# Patient Record
Sex: MALE | URBAN - METROPOLITAN AREA
[De-identification: names, ages, dates, MRNs, and addresses within clinical notes are randomized per-mention and may not be internally consistent; named-entity substitution may affect disease eponyms.]

---

## 2025-02-08 ENCOUNTER — PHARMACY VISIT (OUTPATIENT)
Dept: PHARMACY | Facility: CLINIC | Age: 70
End: 2025-02-08
Payer: COMMERCIAL

## 2025-02-08 ENCOUNTER — OFFICE VISIT (OUTPATIENT)
Dept: URGENT CARE | Age: 70
End: 2025-02-08
Payer: COMMERCIAL

## 2025-02-08 VITALS
RESPIRATION RATE: 16 BRPM | SYSTOLIC BLOOD PRESSURE: 135 MMHG | WEIGHT: 250 LBS | HEART RATE: 76 BPM | OXYGEN SATURATION: 94 % | DIASTOLIC BLOOD PRESSURE: 77 MMHG

## 2025-02-08 DIAGNOSIS — J30.81 ALLERGIC RHINITIS DUE TO ANIMAL HAIR AND DANDER: Primary | ICD-10-CM

## 2025-02-08 DIAGNOSIS — R06.02 SOB (SHORTNESS OF BREATH): ICD-10-CM

## 2025-02-08 PROCEDURE — RXMED WILLOW AMBULATORY MEDICATION CHARGE

## 2025-02-08 RX ORDER — ATORVASTATIN CALCIUM 40 MG/1
40 TABLET, FILM COATED ORAL
COMMUNITY
Start: 2024-11-13

## 2025-02-08 RX ORDER — MONTELUKAST SODIUM 10 MG/1
10 TABLET ORAL NIGHTLY
Qty: 30 TABLET | Refills: 0 | Status: SHIPPED | OUTPATIENT
Start: 2025-02-08 | End: 2025-03-10

## 2025-02-08 RX ORDER — PREDNISONE 20 MG/1
20 TABLET ORAL 2 TIMES DAILY
Qty: 10 TABLET | Refills: 0 | Status: SHIPPED | OUTPATIENT
Start: 2025-02-08 | End: 2025-02-13

## 2025-02-08 RX ORDER — ALBUTEROL SULFATE 90 UG/1
2 INHALANT RESPIRATORY (INHALATION) EVERY 6 HOURS PRN
Qty: 18 G | Refills: 0 | Status: SHIPPED | OUTPATIENT
Start: 2025-02-08 | End: 2026-02-08

## 2025-02-08 RX ORDER — EMPAGLIFLOZIN 25 MG/1
1 TABLET, FILM COATED ORAL
COMMUNITY
Start: 2024-03-20

## 2025-02-08 RX ORDER — ASPIRIN 81 MG/1
81 TABLET ORAL DAILY
COMMUNITY

## 2025-02-08 RX ORDER — AMLODIPINE BESYLATE 10 MG/1
1 TABLET ORAL DAILY
COMMUNITY
Start: 2024-03-20

## 2025-02-08 RX ORDER — AMOXICILLIN AND CLAVULANATE POTASSIUM 875; 125 MG/1; MG/1
875 TABLET, FILM COATED ORAL 2 TIMES DAILY
Qty: 14 TABLET | Refills: 0 | Status: SHIPPED | OUTPATIENT
Start: 2025-02-08 | End: 2025-02-15

## 2025-02-08 RX ORDER — AMOXICILLIN 500 MG/1
1 TABLET, FILM COATED ORAL EVERY 12 HOURS
COMMUNITY
Start: 2021-08-01

## 2025-02-08 RX ORDER — FLUTICASONE PROPIONATE 50 MCG
1 SPRAY, SUSPENSION (ML) NASAL DAILY
COMMUNITY
Start: 2020-01-04

## 2025-02-08 ASSESSMENT — ENCOUNTER SYMPTOMS
CHEST TIGHTNESS: 0
NAUSEA: 0
VOMITING: 0
FEVER: 0
SHORTNESS OF BREATH: 1
SINUS PRESSURE: 0
ARTHRALGIAS: 0
COUGH: 1
CHILLS: 0
CONFUSION: 0
CHOKING: 0
ADENOPATHY: 0
HEADACHES: 0
APNEA: 0
SINUS PAIN: 1
WHEEZING: 0
DIZZINESS: 0
RHINORRHEA: 1
PALPITATIONS: 0

## 2025-02-08 NOTE — PROGRESS NOTES
Subjective   Patient ID: Kamari Hurd is a 69 y.o. male. They present today with a chief complaint of Sinusitis.    History of Present Illness  69-year-old non-smoker quit 32 years ago with his wife for concerns of nasal congestion, sinus infection, cold symptoms few weeks ago last week exposed to cat at a house that he was working that caused worsening of his symptoms.  He denies fever or chills.  He does report some difficulty breathing no chest pain or shortness of breath.  He reports remote history of nasal polyp and surgery.  Currently on Flonase.  He has not taken any oral antihistamine or additional medicines yet.  Similar symptoms in the past treated with steroids and antibiotics.      Sinusitis  Pain details:     Location:  Frontal and maxillary    Quality:  Pressure  Ineffective treatments:  None tried  Associated symptoms: congestion, cough, rhinorrhea, shortness of breath and sneezing    Associated symptoms: no chest pain, no chills, no fever, no headaches, no nausea, no vomiting and no wheezing    Risk factors: diabetes    Risk factors: no asthma        Past Medical History  Allergies as of 02/08/2025 - Reviewed 02/08/2025   Allergen Reaction Noted    Lisinopril Cough 07/06/2019    Allerg xt,d.farinae-d.pteronys Rash 01/29/2010       (Not in a hospital admission)       Past Medical History:   Diagnosis Date    Personal history of other diseases of the respiratory system 08/01/2021    History of acute sinusitis       History reviewed. No pertinent surgical history.         Review of Systems  Review of Systems   Constitutional:  Negative for chills and fever.   HENT:  Positive for congestion, rhinorrhea, sinus pain and sneezing. Negative for sinus pressure.    Respiratory:  Positive for cough and shortness of breath. Negative for apnea, choking, chest tightness and wheezing.    Cardiovascular:  Negative for chest pain and palpitations.   Gastrointestinal:  Negative for nausea and vomiting.    Musculoskeletal:  Negative for arthralgias.   Neurological:  Negative for dizziness and headaches.   Hematological:  Negative for adenopathy.   Psychiatric/Behavioral:  Negative for confusion.                                   Objective    Vitals:    02/08/25 1228   BP: 135/77   BP Location: Left arm   Patient Position: Sitting   BP Cuff Size: Large adult   Pulse: 76   Resp: 16   SpO2: 94%   Weight: 113 kg (250 lb)     No LMP for male patient.    Physical Exam  Vitals and nursing note reviewed.   Constitutional:       General: He is not in acute distress.     Appearance: Normal appearance. He is not ill-appearing, toxic-appearing or diaphoretic.   HENT:      Right Ear: Tympanic membrane normal.      Left Ear: Tympanic membrane normal.      Nose: Congestion present. No rhinorrhea.      Comments: Need to obstruction bilateral nasal polyps noted     Mouth/Throat:      Pharynx: No oropharyngeal exudate or posterior oropharyngeal erythema.   Cardiovascular:      Rate and Rhythm: Normal rate and regular rhythm.      Pulses: Normal pulses.      Heart sounds: Normal heart sounds.   Pulmonary:      Effort: Pulmonary effort is normal.      Comments: Decreased breath sounds bilaterally  Lymphadenopathy:      Cervical: No cervical adenopathy.   Neurological:      General: No focal deficit present.      Mental Status: He is alert and oriented to person, place, and time.   Psychiatric:         Mood and Affect: Mood normal.         Behavior: Behavior normal.         Procedures    Point of Care Test & Imaging Results from this visit  No results found for this visit on 02/08/25.   No results found.    Diagnostic study results (if any) were reviewed by Ave Murry MD.    Assessment/Plan   Allergies, medications, history, and pertinent labs/EKGs/Imaging reviewed by Ave Murry MD.     Medical Decision Making  Based on history, clinical exam, review of test results negative for COVID and flu, history of nasal polyp, s/p  surgery with acute allergic rhinosinusitis recommend treatment with oral steroid, antibiotic.  Please continue with the Flonase , use  oxymetazoline for the next 3 days.  Daily Allegra recommended.  Referral to ENT provided.  Please continue with the albuterol inhaler 4 times a day for the next 5 to 7 days.  If new or worsening symptoms return for evaluation or go to the ER.  Close monitoring of your blood glucose, please refrain from drinking soda and pop.     Orders and Diagnoses  Diagnoses and all orders for this visit:  Allergic rhinitis due to animal hair and dander  -     montelukast (Singulair) 10 mg tablet; Take 1 tablet (10 mg) by mouth once daily at bedtime.  -     predniSONE (Deltasone) 20 mg tablet; Take 1 tablet (20 mg) by mouth 2 times a day for 5 days.  -     amoxicillin-pot clavulanate (Augmentin) 875-125 mg tablet; Take 1 tablet (875 mg) by mouth 2 times a day for 7 days.  -     oxymetazoline 0.025 % spray,non-aerosol; Administer 2 sprays into affected nostril(s) 2 times a day as needed (nasal obstruction) for up to 3 days.  -     Referral to ENT; Future  SOB (shortness of breath)  -     albuterol 90 mcg/actuation inhaler; Inhale 2 puffs every 6 hours if needed for wheezing.      Medical Admin Record      Patient disposition: Home    Electronically signed by Ave Murry MD  6:59 PM

## 2025-02-08 NOTE — PATIENT INSTRUCTIONS
Based on history, clinical exam, review of test results negative for COVID and flu, history of nasal polyp, s/p surgery with acute allergic rhinosinusitis recommend treatment with oral steroid, antibiotic.  Please continue with the Flonase , use  oxymetazoline for the next 3 days.  Daily Allegra recommended.  Referral to ENT provided.  Please continue with the albuterol inhaler 4 times a day for the next 5 to 7 days.  If new or worsening symptoms return for evaluation or go to the ER.  Close monitoring of your blood glucose, please refrain from drinking soda and pop.

## 2025-04-17 ENCOUNTER — HOSPITAL ENCOUNTER (EMERGENCY)
Facility: HOSPITAL | Age: 70
Discharge: HOME | End: 2025-04-17
Payer: COMMERCIAL

## 2025-04-17 VITALS
WEIGHT: 268 LBS | BODY MASS INDEX: 40.62 KG/M2 | HEIGHT: 68 IN | SYSTOLIC BLOOD PRESSURE: 141 MMHG | HEART RATE: 83 BPM | RESPIRATION RATE: 16 BRPM | DIASTOLIC BLOOD PRESSURE: 78 MMHG | TEMPERATURE: 97.9 F | OXYGEN SATURATION: 98 %

## 2025-04-17 DIAGNOSIS — R11.2 NAUSEA AND VOMITING, UNSPECIFIED VOMITING TYPE: Primary | ICD-10-CM

## 2025-04-17 LAB — GLUCOSE BLD MANUAL STRIP-MCNC: 97 MG/DL (ref 74–99)

## 2025-04-17 PROCEDURE — 99284 EMERGENCY DEPT VISIT MOD MDM: CPT

## 2025-04-17 PROCEDURE — 82947 ASSAY GLUCOSE BLOOD QUANT: CPT

## 2025-04-17 PROCEDURE — 2500000004 HC RX 250 GENERAL PHARMACY W/ HCPCS (ALT 636 FOR OP/ED)

## 2025-04-17 PROCEDURE — 99283 EMERGENCY DEPT VISIT LOW MDM: CPT

## 2025-04-17 RX ORDER — ONDANSETRON 4 MG/1
4 TABLET, ORALLY DISINTEGRATING ORAL ONCE
Status: COMPLETED | OUTPATIENT
Start: 2025-04-17 | End: 2025-04-17

## 2025-04-17 RX ORDER — ONDANSETRON 4 MG/1
TABLET, ORALLY DISINTEGRATING ORAL
Status: COMPLETED
Start: 2025-04-17 | End: 2025-04-17

## 2025-04-17 RX ADMIN — ONDANSETRON 4 MG: 4 TABLET, ORALLY DISINTEGRATING ORAL at 14:26

## 2025-04-17 ASSESSMENT — PAIN - FUNCTIONAL ASSESSMENT: PAIN_FUNCTIONAL_ASSESSMENT: 0-10

## 2025-04-17 ASSESSMENT — PAIN SCALES - GENERAL: PAINLEVEL_OUTOF10: 0 - NO PAIN

## 2025-04-17 NOTE — ED TRIAGE NOTES
While being transported another passenger vomited. Pt then experienced nausea/vomiting. Both now resolved. No fever/chills. No abd pain. No cp/sob.

## 2025-04-17 NOTE — ED PROVIDER NOTES
HPI   Chief Complaint   Patient presents with    Vomiting     While being transported another passenger vomited. Pt then experienced nausea/vomiting. Both now resolved. No fever/chills. No abd pain. No cp/sob.        70-year-old male with history of diabetes, hypertension presents for chief complaint of nausea and vomiting.  Occurred while in transport between residential.  He is incarcerated and comes with police escort.  States that he took his meds earlier today and did not eat enough.  States that the car ride afterward made him feel nauseous and he threw up 1 time.  States that he typically takes his meds and does not have any issues.  Denies any recent illness.  Felt fine before getting in the car.  States that he has been carsick in the past.  Denies hypoglycemia recently.  Denies chest pain or dyspnea.  No abdominal pains.  States that all symptoms have resolved at this point and he feels much better.  Asking for food to eat.              Patient History   Medical History[1]  Surgical History[2]  Family History[3]  Social History[4]    Physical Exam   ED Triage Vitals [04/17/25 1252]   Temperature Heart Rate Respirations BP   36.6 °C (97.9 °F) 83 16 141/78      Pulse Ox Temp Source Heart Rate Source Patient Position   98 % Oral -- --      BP Location FiO2 (%)     -- --       Physical Exam  Vitals and nursing note reviewed.   Constitutional:       General: He is not in acute distress.     Appearance: He is well-developed.   HENT:      Head: Normocephalic and atraumatic.   Eyes:      Conjunctiva/sclera: Conjunctivae normal.   Cardiovascular:      Rate and Rhythm: Normal rate and regular rhythm.      Heart sounds: No murmur heard.  Pulmonary:      Effort: Pulmonary effort is normal. No respiratory distress.      Breath sounds: Normal breath sounds.   Abdominal:      Palpations: Abdomen is soft.      Tenderness: There is no abdominal tenderness.   Musculoskeletal:         General: No swelling.      Cervical back:  Neck supple.   Skin:     General: Skin is warm and dry.      Capillary Refill: Capillary refill takes less than 2 seconds.   Neurological:      Mental Status: He is alert.   Psychiatric:         Mood and Affect: Mood normal.           ED Course & MDM   Diagnoses as of 04/17/25 1440   Nausea and vomiting, unspecified vomiting type                 No data recorded     [place] Coma Scale Score: 15 (04/17/25 1254 : Jesús Canseco RN)                           Medical Decision Making  Vital signs reviewed, unremarkable at this time.  Patient is well-appearing and in no apparent distress.  Speaks full sentences without difficulty.  Diagnostic testing performed.  Blood glucose 97.  Physical exam overall reassuring and unremarkable.  No abdominal tenderness.  States that he feels much improved.  He is asking for food and to leave.  Able to tolerate p.o. well.  Given Zofran as he likely had carsick.  He will be driving home with PD and wants to avoid getting carsick again.  Advised to follow-up with primary care and to return with any new or worsening symptoms.  Patient in agreement this plan.  Discharged in stable condition.  Medically cleared.        Procedure  Procedures       [1]   Past Medical History:  Diagnosis Date    Personal history of other diseases of the respiratory system 08/01/2021    History of acute sinusitis   [2] No past surgical history on file.  [3] No family history on file.  [4]   Social History  Tobacco Use    Smoking status: Not on file    Smokeless tobacco: Not on file   Substance Use Topics    Alcohol use: Not on file    Drug use: Not on file        Eulalio Meadows, CHRISTINE-CNP  04/17/25 3663